# Patient Record
Sex: FEMALE | Race: WHITE | NOT HISPANIC OR LATINO | Employment: OTHER | URBAN - METROPOLITAN AREA
[De-identification: names, ages, dates, MRNs, and addresses within clinical notes are randomized per-mention and may not be internally consistent; named-entity substitution may affect disease eponyms.]

---

## 2021-10-25 ENCOUNTER — APPOINTMENT (OUTPATIENT)
Dept: MRI IMAGING | Facility: CLINIC | Age: 76
End: 2021-10-25
Attending: EMERGENCY MEDICINE
Payer: COMMERCIAL

## 2021-10-25 ENCOUNTER — HOSPITAL ENCOUNTER (EMERGENCY)
Facility: CLINIC | Age: 76
Discharge: HOME | End: 2021-10-25
Attending: EMERGENCY MEDICINE | Admitting: EMERGENCY MEDICINE
Payer: COMMERCIAL

## 2021-10-25 VITALS
RESPIRATION RATE: 18 BRPM | HEART RATE: 88 BPM | OXYGEN SATURATION: 90 % | DIASTOLIC BLOOD PRESSURE: 91 MMHG | TEMPERATURE: 98 F | SYSTOLIC BLOOD PRESSURE: 175 MMHG

## 2021-10-25 DIAGNOSIS — R42 VERTIGO: ICD-10-CM

## 2021-10-25 PROBLEM — G47.33 OSA (OBSTRUCTIVE SLEEP APNEA): Status: ACTIVE | Noted: 2020-09-24

## 2021-10-25 LAB
ANION GAP SERPL CALCULATED.3IONS-SCNC: 12 MMOL/L (ref 5–18)
BASOPHILS # BLD AUTO: 0.1 10E3/UL (ref 0–0.2)
BASOPHILS NFR BLD AUTO: 1 %
BUN SERPL-MCNC: 14 MG/DL (ref 8–28)
CALCIUM SERPL-MCNC: 9.6 MG/DL (ref 8.5–10.5)
CHLORIDE BLD-SCNC: 104 MMOL/L (ref 98–107)
CO2 SERPL-SCNC: 23 MMOL/L (ref 22–31)
CREAT SERPL-MCNC: 0.77 MG/DL (ref 0.6–1.1)
EOSINOPHIL # BLD AUTO: 0 10E3/UL (ref 0–0.7)
EOSINOPHIL NFR BLD AUTO: 0 %
ERYTHROCYTE [DISTWIDTH] IN BLOOD BY AUTOMATED COUNT: 14.5 % (ref 10–15)
GFR SERPL CREATININE-BSD FRML MDRD: 76 ML/MIN/1.73M2
GLUCOSE BLD-MCNC: 149 MG/DL (ref 70–125)
HCT VFR BLD AUTO: 41.7 % (ref 35–47)
HGB BLD-MCNC: 13.6 G/DL (ref 11.7–15.7)
IMM GRANULOCYTES # BLD: 0.1 10E3/UL
IMM GRANULOCYTES NFR BLD: 1 %
LYMPHOCYTES # BLD AUTO: 1.2 10E3/UL (ref 0.8–5.3)
LYMPHOCYTES NFR BLD AUTO: 11 %
MCH RBC QN AUTO: 28.5 PG (ref 26.5–33)
MCHC RBC AUTO-ENTMCNC: 32.6 G/DL (ref 31.5–36.5)
MCV RBC AUTO: 87 FL (ref 78–100)
MONOCYTES # BLD AUTO: 0.7 10E3/UL (ref 0–1.3)
MONOCYTES NFR BLD AUTO: 6 %
NEUTROPHILS # BLD AUTO: 9.2 10E3/UL (ref 1.6–8.3)
NEUTROPHILS NFR BLD AUTO: 81 %
NRBC # BLD AUTO: 0 10E3/UL
NRBC BLD AUTO-RTO: 0 /100
PLATELET # BLD AUTO: 313 10E3/UL (ref 150–450)
POTASSIUM BLD-SCNC: 4.4 MMOL/L (ref 3.5–5)
RBC # BLD AUTO: 4.77 10E6/UL (ref 3.8–5.2)
SODIUM SERPL-SCNC: 139 MMOL/L (ref 136–145)
TROPONIN I SERPL-MCNC: <0.01 NG/ML (ref 0–0.29)
WBC # BLD AUTO: 11.3 10E3/UL (ref 4–11)

## 2021-10-25 PROCEDURE — A9585 GADOBUTROL INJECTION: HCPCS | Performed by: EMERGENCY MEDICINE

## 2021-10-25 PROCEDURE — 255N000002 HC RX 255 OP 636: Performed by: EMERGENCY MEDICINE

## 2021-10-25 PROCEDURE — 84484 ASSAY OF TROPONIN QUANT: CPT | Performed by: EMERGENCY MEDICINE

## 2021-10-25 PROCEDURE — 93005 ELECTROCARDIOGRAM TRACING: CPT | Performed by: EMERGENCY MEDICINE

## 2021-10-25 PROCEDURE — 85025 COMPLETE CBC W/AUTO DIFF WBC: CPT | Performed by: EMERGENCY MEDICINE

## 2021-10-25 PROCEDURE — 250N000013 HC RX MED GY IP 250 OP 250 PS 637: Performed by: EMERGENCY MEDICINE

## 2021-10-25 PROCEDURE — 36415 COLL VENOUS BLD VENIPUNCTURE: CPT | Performed by: EMERGENCY MEDICINE

## 2021-10-25 PROCEDURE — 96374 THER/PROPH/DIAG INJ IV PUSH: CPT | Mod: 59

## 2021-10-25 PROCEDURE — 70553 MRI BRAIN STEM W/O & W/DYE: CPT

## 2021-10-25 PROCEDURE — 99285 EMERGENCY DEPT VISIT HI MDM: CPT | Mod: 25

## 2021-10-25 PROCEDURE — 80048 BASIC METABOLIC PNL TOTAL CA: CPT | Performed by: EMERGENCY MEDICINE

## 2021-10-25 PROCEDURE — 250N000011 HC RX IP 250 OP 636: Performed by: EMERGENCY MEDICINE

## 2021-10-25 RX ORDER — MECLIZINE HYDROCHLORIDE 25 MG/1
25 TABLET ORAL ONCE
Status: COMPLETED | OUTPATIENT
Start: 2021-10-25 | End: 2021-10-25

## 2021-10-25 RX ORDER — DIAZEPAM 5 MG
5 TABLET ORAL ONCE
Status: COMPLETED | OUTPATIENT
Start: 2021-10-25 | End: 2021-10-25

## 2021-10-25 RX ORDER — GADOBUTROL 604.72 MG/ML
10 INJECTION INTRAVENOUS ONCE
Status: COMPLETED | OUTPATIENT
Start: 2021-10-25 | End: 2021-10-25

## 2021-10-25 RX ORDER — LORAZEPAM 2 MG/ML
1 INJECTION INTRAMUSCULAR ONCE
Status: COMPLETED | OUTPATIENT
Start: 2021-10-25 | End: 2021-10-25

## 2021-10-25 RX ORDER — MECLIZINE HYDROCHLORIDE 25 MG/1
25 TABLET ORAL 3 TIMES DAILY PRN
Qty: 20 TABLET | Refills: 0 | Status: SHIPPED | OUTPATIENT
Start: 2021-10-25

## 2021-10-25 RX ORDER — DIAZEPAM 5 MG
5 TABLET ORAL EVERY 8 HOURS PRN
Qty: 12 TABLET | Refills: 0 | Status: SHIPPED | OUTPATIENT
Start: 2021-10-25

## 2021-10-25 RX ADMIN — DIAZEPAM 5 MG: 5 TABLET ORAL at 11:07

## 2021-10-25 RX ADMIN — LORAZEPAM 1 MG: 2 INJECTION INTRAMUSCULAR; INTRAVENOUS at 13:19

## 2021-10-25 RX ADMIN — MECLIZINE HYDROCHLORIDE 25 MG: 25 TABLET ORAL at 11:07

## 2021-10-25 RX ADMIN — GADOBUTROL 10 ML: 604.72 INJECTION INTRAVENOUS at 14:19

## 2021-10-25 NOTE — ED PROVIDER NOTES
EMERGENCY DEPARTMENT ENCOUNTER      NAME: Lili Harmon  AGE: 76 year old female  YOB: 1945  MRN: [id number]  EVALUATION DATE & TIME: 10/25/2021  9:33 AM    PCP: No primary care provider on file.    ED PROVIDER: Michelle Nolan MD    Chief Complaint   Patient presents with     Dizziness         FINAL IMPRESSION:  1. Vertigo          ED COURSE & MEDICAL DECISION MAKING:    Pertinent Labs & Imaging studies reviewed. (See chart for details)  76 year old female with history of HTN, obesity, JENIFER who presents to the Emergency Department for evaluation of vertigo since around 12:30 AM.  Worse when she turns to the right.  She does also have slight right-sided headache.  Neurologic examination unremarkable.  Hints exam reassuring.  Given her age however concern for posterior circulation etiology, CVA, TIA, mass lesion.  ICH less likely.  She has not had any recent slips falls trauma chiropractic manipulation to suspect a vertebral dissection.  BPPV, Ménière's, labyrinthitis on the differential.    Patient placed on monitor.  Given oral Valium, meclizine.  Twelve-lead EKG sinus rhythm with inferior infarct.  CBC, BMP, troponin unremarkable.  Despite the above patient still quite hesitant with anxiety and claustrophobia, concern for MRI but agreeable to trial after IV Ativan.  Given 1 mg Ativan IV.  MRI results pending at time of dictation.  Signed out to Dr. Boggs awaiting results of same.       ED Course as of Oct 25 1410   Mon Oct 25, 2021   1313 Still hesitant to do MRI for anxiety/phobia.  We will try with Ativan IV.      1409 Still in MRI. Will sign out to Dr. Boggs pending MRI results.         10:27 AM I met the patient and performed my initial interview and exam.    At the conclusion of the encounter I discussed the results of all of the tests and the disposition. The questions were answered. The patient or family acknowledged understanding and was agreeable with the care plan.      MEDICATIONS  GIVEN IN THE EMERGENCY:  Medications   diazepam (VALIUM) tablet 5 mg (5 mg Oral Given 10/25/21 1107)   meclizine (ANTIVERT) tablet 25 mg (25 mg Oral Given 10/25/21 1107)   LORazepam (ATIVAN) injection 1 mg (1 mg Intravenous Given 10/25/21 1319)       NEW PRESCRIPTIONS STARTED AT TODAY'S ER VISIT  New Prescriptions    No medications on file          =================================================================    HPI    Patient information was obtained from: patient    Use of Intrepreter: N/A       Lili Harmon is a 76 year old female with pertinent medical history of hypertension, sleep apnea who presents for evaluation of dizziness.    This morning around 12:30am the patient had onset of room spinning dizziness when turning onto her right side, accompanied by nausea. She also has associated right sided neck pain. She feels less discomfort when lying on her left side. She denies history of vertigo or strokes. She is otherwise feeling well and denies ear pressure/pain/drainage, changes in hearing, or any additional complaints at this time.      REVIEW OF SYSTEMS  Constitutional:  Denies fever, chills, weight loss or weakness  Eyes:  No pain, discharge, redness  HENT:  Negative for ear pressure, pain, or drainage. Negative for hearing changes. Denies sore throat, ear pain, congestion  Respiratory: No SOB, wheeze or cough  Cardiovascular:  No CP, palpitations  GI: Positive for nausea. Denies abdominal pain, vomiting, diarrhea  Musculoskeletal:  Positive for right neck pain. Denies any new muscle/joint swelling or loss of function.  Neurologic: Positive for dizziness. Denies headache, focal weakness or sensory changes  All other systems negative unless noted in HPI.      PAST MEDICAL HISTORY:  Past Medical History:   Diagnosis Date     Diverticulosis      Esophageal stricture      Hypertension      Obesity      JENIFER (obstructive sleep apnea)        PAST SURGICAL HISTORY:  History reviewed. No pertinent  surgical history.    CURRENT MEDICATIONS:    None       ALLERGIES:  Allergies   Allergen Reactions     Bee Venom Anaphylaxis     BEE STINGS    cause anaphylaxis.      Contrast Dye Anaphylaxis     Diatrizoate Anaphylaxis     Sulfa Drugs Anaphylaxis     Carvedilol Other (See Comments) and Swelling     Chlorthalidone Cough and Other (See Comments)     Leg cramping /unable to move legs / couldn't get out of bed        FAMILY HISTORY:  History reviewed. No pertinent family history.    SOCIAL HISTORY:  Social History     Tobacco Use     Smoking status: None   Substance Use Topics     Alcohol use: None     Drug use: None        VITALS:  Patient Vitals for the past 24 hrs:   BP Temp Temp src Pulse Resp SpO2   10/25/21 0945 (!) 172/74 98  F (36.7  C) Oral 80 22 93 %       PHYSICAL EXAM    General Appearance: Well-appearing, well-nourished, no acute distress   Head:  Normocephalic, atraumatic  Eyes:  PERRL, conjunctiva/corneas clear, EOM's intact  ENT: TM's clear. Lips, mucosa, and tongue normal; membranes are moist without pallor  Neck:  Supple, no bruit  Chest:  No tenderness or deformity, no crepitus  Cardio:  Regular rate and rhythm, no murmur/gallop/rub  Pulm:  No respiratory distress, clear to auscultation bilaterally  Abdomen: Morbidly obese  Extremities: Moves all extremities normally  Skin:  Skin warm, dry, no rashes  Neuro: Cranial nerves III-XII intact. 5+/5 upper/lower extremity strength. HINTS exam reassuring. Alert and oriented ×3, moving all extremities, no gross sensory defects     RADIOLOGY/LABS:  Reviewed all pertinent imaging. Please see official radiology report. All pertinent labs reviewed and interpreted.    Results for orders placed or performed during the hospital encounter of 10/25/21   Basic metabolic panel   Result Value Ref Range    Sodium 139 136 - 145 mmol/L    Potassium 4.4 3.5 - 5.0 mmol/L    Chloride 104 98 - 107 mmol/L    Carbon Dioxide (CO2) 23 22 - 31 mmol/L    Anion Gap 12 5 - 18 mmol/L     Urea Nitrogen 14 8 - 28 mg/dL    Creatinine 0.77 0.60 - 1.10 mg/dL    Calcium 9.6 8.5 - 10.5 mg/dL    Glucose 149 (H) 70 - 125 mg/dL    GFR Estimate 76 >60 mL/min/1.73m2   Troponin I (now)   Result Value Ref Range    Troponin I <0.01 0.00 - 0.29 ng/mL   CBC with platelets and differential   Result Value Ref Range    WBC Count 11.3 (H) 4.0 - 11.0 10e3/uL    RBC Count 4.77 3.80 - 5.20 10e6/uL    Hemoglobin 13.6 11.7 - 15.7 g/dL    Hematocrit 41.7 35.0 - 47.0 %    MCV 87 78 - 100 fL    MCH 28.5 26.5 - 33.0 pg    MCHC 32.6 31.5 - 36.5 g/dL    RDW 14.5 10.0 - 15.0 %    Platelet Count 313 150 - 450 10e3/uL    % Neutrophils 81 %    % Lymphocytes 11 %    % Monocytes 6 %    % Eosinophils 0 %    % Basophils 1 %    % Immature Granulocytes 1 %    NRBCs per 100 WBC 0 <1 /100    Absolute Neutrophils 9.2 (H) 1.6 - 8.3 10e3/uL    Absolute Lymphocytes 1.2 0.8 - 5.3 10e3/uL    Absolute Monocytes 0.7 0.0 - 1.3 10e3/uL    Absolute Eosinophils 0.0 0.0 - 0.7 10e3/uL    Absolute Basophils 0.1 0.0 - 0.2 10e3/uL    Absolute Immature Granulocytes 0.1 (H) <=0.0 10e3/uL    Absolute NRBCs 0.0 10e3/uL       EKG:  Performed at: 11:14    Impression: Sinus rhythm  Inferior infarct, age undetermined  Abnormal ECG    Rate: 83  Rhythm: sinus  Axis: -23  IL Interval: 180  QRS Interval: 82  QTc Interval: 448  ST Changes: none  Comparison: No previous ECGs available    I have independently reviewed and interpreted the EKG(s) documented above.    The creation of this record is based on the scribe s observations of the work being performed by Michelle Nolan MD and the provider s statements to them. It was created on his behalf by Shauna Granados, a trained medical scribe. This document has been checked and approved by the attending provider.    Michelle Nolan MD  Emergency Medicine  [facility] EMERGENCY ROOM  [address] [facility] [address] [phone]  Dept: [phone]     Ovidio,  Michelle BLEDSOE MD  10/25/21 6764

## 2021-10-25 NOTE — ED PROVIDER NOTES
eMERGENCY dEPARTMENT PROGRESS NOTE         ED COURSE AND MEDICAL DECISION MAKING  76-year-old female presented to the ED for evaluation of vertigo that started in the middle night after she rolled over in bed.  The patient was tractotomy by Dr. Bermudez with an MRI pending.     The laboratory tests were reviewed and were noted to be unremarkable.  Brain MRI did not show evidence of acute infarction or other recent ischemia, hemorrhage, or mass.  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    Patient was reevaluated and informed of the reassuring lab and MRI results.  The patient stated that her vertigo had improved but was not completely resolved.  She was able to ambulate in the ED to the bathroom without significant difficulty.  The patient felt comfortable returning home.  The patient was educated about BPPV and reassured.  The patient was sent home with oral meclizine to use as needed.  She was also sent home with a few tabs of Valium to take as needed if the meclizine was not working for her vertigo symptoms.  She was instructed to follow-up with her primary care physician for reevaluation within the next few days or to return back to ED sooner for any worsening vertigo, vomiting, ataxia, or any other new or concerning symptoms.  The patient indicated agreement and understanding of the discharge instructions.    Patient was signed out to me by Dr. Nolan at 2:20 PM pending MRI results.  3:29 PM I met with and updated the patient on their results from the MRI.     Lili Harmon is a 76 year old female who presented to the ED for evaluation of dizziness.    This morning around 12:30am the patient had onset of room spinning dizziness when turning onto her right side, accompanied by nausea. She also has associated right sided neck pain. She feels less discomfort when lying on her left side. She denies history of vertigo or strokes. She is otherwise feeling well and denies ear  pressure/pain/drainage, changes in hearing, or any additional complaints at this time.       At the conclusion of the encounter I discussed the results of all of the tests and the disposition. The questions were answered. The patient or family acknowledged understanding and was agreeable with the care plan.     LAB  Pertinent labs results reviewed   Results for orders placed or performed during the hospital encounter of 10/25/21   MR Brain COW Carotid wwo Contrast    Impression    CONCLUSION:  HEAD MRI:  1.  No acute intracranial finding. No evidence for recent ischemia, intracranial hemorrhage, or mass.  2.  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  3.  Mild atrophy and mild to moderate presumed chronic small vessel ischemic changes.  4.  Enhancement within the left parietal skull without expansion or adjacent soft tissue mass favored to be vascular and benign. As a conservative measure suggest correlation with noncontrast head CT.    HEAD MRA:  1.  Negative MR angiogram of the brain. No evidence for aneurysm, proximal vessel occlusion, high-grade intracranial stenosis or high flow vascular lesion.    NECK MRA:  1.  No evidence for dissection or hemodynamically significant narrowing in the neck based on NASCET criteria.   Basic metabolic panel   Result Value Ref Range    Sodium 139 136 - 145 mmol/L    Potassium 4.4 3.5 - 5.0 mmol/L    Chloride 104 98 - 107 mmol/L    Carbon Dioxide (CO2) 23 22 - 31 mmol/L    Anion Gap 12 5 - 18 mmol/L    Urea Nitrogen 14 8 - 28 mg/dL    Creatinine 0.77 0.60 - 1.10 mg/dL    Calcium 9.6 8.5 - 10.5 mg/dL    Glucose 149 (H) 70 - 125 mg/dL    GFR Estimate 76 >60 mL/min/1.73m2   Troponin I (now)   Result Value Ref Range    Troponin I <0.01 0.00 - 0.29 ng/mL   CBC with platelets and differential   Result Value Ref Range    WBC Count 11.3 (H) 4.0 - 11.0 10e3/uL    RBC Count 4.77 3.80 - 5.20 10e6/uL    Hemoglobin 13.6 11.7 - 15.7 g/dL    Hematocrit 41.7 35.0 - 47.0 %     MCV 87 78 - 100 fL    MCH 28.5 26.5 - 33.0 pg    MCHC 32.6 31.5 - 36.5 g/dL    RDW 14.5 10.0 - 15.0 %    Platelet Count 313 150 - 450 10e3/uL    % Neutrophils 81 %    % Lymphocytes 11 %    % Monocytes 6 %    % Eosinophils 0 %    % Basophils 1 %    % Immature Granulocytes 1 %    NRBCs per 100 WBC 0 <1 /100    Absolute Neutrophils 9.2 (H) 1.6 - 8.3 10e3/uL    Absolute Lymphocytes 1.2 0.8 - 5.3 10e3/uL    Absolute Monocytes 0.7 0.0 - 1.3 10e3/uL    Absolute Eosinophils 0.0 0.0 - 0.7 10e3/uL    Absolute Basophils 0.1 0.0 - 0.2 10e3/uL    Absolute Immature Granulocytes 0.1 (H) <=0.0 10e3/uL    Absolute NRBCs 0.0 10e3/uL         RADIOLOGY    Pertinent imaging reviewed   Please see official radiology report.  MR Brain COW Carotid wwo Contrast   Final Result   CONCLUSION:   HEAD MRI:   1.  No acute intracranial finding. No evidence for recent ischemia, intracranial hemorrhage, or mass.   2.  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   3.  Mild atrophy and mild to moderate presumed chronic small vessel ischemic changes.   4.  Enhancement within the left parietal skull without expansion or adjacent soft tissue mass favored to be vascular and benign. As a conservative measure suggest correlation with noncontrast head CT.      HEAD MRA:   1.  Negative MR angiogram of the brain. No evidence for aneurysm, proximal vessel occlusion, high-grade intracranial stenosis or high flow vascular lesion.      NECK MRA:   1.  No evidence for dissection or hemodynamically significant narrowing in the neck based on NASCET criteria.          FINAL IMPRESSION    1. Vertigo         DISCHARGE PRESCRIPTIONS  New Prescriptions    No medications on file            I, Temi Whatley, am serving as a scribe to document services personally performed by Dr. Boggs based on my observation and the provider's statements to me. I, Parminder Boggs, DO, attest that Temi Whatley is acting in a scribe capacity, has observed my performance of the  services and has documented them in accordance with my direction.        Parminder Boggs, DO  10/25/21 2726

## 2021-10-25 NOTE — ED TRIAGE NOTES
Diziness since 0030. Feels as if the room is spinning. More prominent when she turns her head to the right. Never had vertigo before.

## 2021-10-25 NOTE — DISCHARGE INSTRUCTIONS
Your laboratory tests and brain MRI appear reassuring.  Your symptoms appear consistent with benign paroxysmal positional vertigo.  Take the prescribed meclizine as needed for any repeat episodes of vertigo.  You can also use the prescribed Valium as needed if the dizziness is not resolving with the meclizine.  These medications can make you feel sleepy or drowsy so please use them with caution.  Your episodes of dizziness will likely resolve on their own over the next few days or even a few weeks.  Follow-up with your primary care physician for routine reevaluation within the next few days or return back to ED sooner for any worsening dizziness or any other new or concerning symptoms.

## 2021-10-29 LAB
ATRIAL RATE - MUSE: 83 BPM
DIASTOLIC BLOOD PRESSURE - MUSE: 89 MMHG
INTERPRETATION ECG - MUSE: NORMAL
P AXIS - MUSE: 40 DEGREES
PR INTERVAL - MUSE: 180 MS
QRS DURATION - MUSE: 82 MS
QT - MUSE: 382 MS
QTC - MUSE: 448 MS
R AXIS - MUSE: -23 DEGREES
SYSTOLIC BLOOD PRESSURE - MUSE: 170 MMHG
T AXIS - MUSE: 42 DEGREES
VENTRICULAR RATE- MUSE: 83 BPM

## 2021-12-05 ENCOUNTER — HEALTH MAINTENANCE LETTER (OUTPATIENT)
Age: 76
End: 2021-12-05

## 2022-09-25 ENCOUNTER — HEALTH MAINTENANCE LETTER (OUTPATIENT)
Age: 77
End: 2022-09-25

## 2023-02-04 ENCOUNTER — HEALTH MAINTENANCE LETTER (OUTPATIENT)
Age: 78
End: 2023-02-04

## 2024-03-02 ENCOUNTER — HEALTH MAINTENANCE LETTER (OUTPATIENT)
Age: 79
End: 2024-03-02